# Patient Record
Sex: FEMALE | Race: BLACK OR AFRICAN AMERICAN | URBAN - METROPOLITAN AREA
[De-identification: names, ages, dates, MRNs, and addresses within clinical notes are randomized per-mention and may not be internally consistent; named-entity substitution may affect disease eponyms.]

---

## 2022-07-01 ENCOUNTER — LAB OUTSIDE AN ENCOUNTER (OUTPATIENT)
Dept: URBAN - METROPOLITAN AREA CLINIC 52 | Facility: CLINIC | Age: 47
End: 2022-07-01

## 2022-07-01 ENCOUNTER — OFFICE VISIT (OUTPATIENT)
Dept: URBAN - METROPOLITAN AREA CLINIC 52 | Facility: CLINIC | Age: 47
End: 2022-07-01
Payer: COMMERCIAL

## 2022-07-01 ENCOUNTER — DASHBOARD ENCOUNTERS (OUTPATIENT)
Age: 47
End: 2022-07-01

## 2022-07-01 VITALS
WEIGHT: 171 LBS | HEIGHT: 63 IN | BODY MASS INDEX: 30.3 KG/M2 | DIASTOLIC BLOOD PRESSURE: 86 MMHG | HEART RATE: 78 BPM | SYSTOLIC BLOOD PRESSURE: 125 MMHG | TEMPERATURE: 97.3 F

## 2022-07-01 DIAGNOSIS — K29.60 OTHER GASTRITIS WITHOUT BLEEDING: ICD-10-CM

## 2022-07-01 DIAGNOSIS — K59.09 CHRONIC CONSTIPATION: ICD-10-CM

## 2022-07-01 DIAGNOSIS — R10.31 RIGHT LOWER QUADRANT PAIN: ICD-10-CM

## 2022-07-01 DIAGNOSIS — R68.81 EARLY SATIETY: ICD-10-CM

## 2022-07-01 PROCEDURE — 99204 OFFICE O/P NEW MOD 45 MIN: CPT | Performed by: INTERNAL MEDICINE

## 2022-07-01 NOTE — HPI-TODAY'S VISIT:
This is a 47 y.o. female with history of anemia who presents to office c/o feeling full quickly and bloating; symptoms are constant. She denies nausea/vomiting. In addition, she reports random, sharp RLQ pain for months, that has progressively worsened. She reports frequent use of Goody's Powder.  Colonoscopy 12/4/2017 (Dr. Rubina Carvalho) with skin tags on perianal exam, and medium-sized, non-bleeding external hemorrhoids; no specimens collected. EGD 12/4/2017 (Dr. Rubina Carvalho) with non-bleeding erosive gastropathy. Pathology consistent with chronic gastritis, no H. pylori.

## 2022-07-05 PROBLEM — 4556007: Status: ACTIVE | Noted: 2022-07-01

## 2022-07-05 PROBLEM — 4556007 GASTRITIS: Status: ACTIVE | Noted: 2022-07-05

## 2022-07-29 ENCOUNTER — OFFICE VISIT (OUTPATIENT)
Dept: URBAN - METROPOLITAN AREA SURGERY CENTER 17 | Facility: SURGERY CENTER | Age: 47
End: 2022-07-29